# Patient Record
Sex: MALE | Race: WHITE
[De-identification: names, ages, dates, MRNs, and addresses within clinical notes are randomized per-mention and may not be internally consistent; named-entity substitution may affect disease eponyms.]

---

## 2018-05-03 ENCOUNTER — HOSPITAL ENCOUNTER (EMERGENCY)
Dept: HOSPITAL 17 - PHED | Age: 33
Discharge: HOME | End: 2018-05-03
Payer: COMMERCIAL

## 2018-05-03 VITALS
HEART RATE: 77 BPM | DIASTOLIC BLOOD PRESSURE: 82 MMHG | TEMPERATURE: 97.9 F | OXYGEN SATURATION: 97 % | RESPIRATION RATE: 16 BRPM | SYSTOLIC BLOOD PRESSURE: 146 MMHG

## 2018-05-03 VITALS — BODY MASS INDEX: 34.48 KG/M2 | HEIGHT: 73 IN | WEIGHT: 260.15 LBS

## 2018-05-03 VITALS — HEART RATE: 73 BPM | RESPIRATION RATE: 16 BRPM | OXYGEN SATURATION: 98 %

## 2018-05-03 VITALS
SYSTOLIC BLOOD PRESSURE: 127 MMHG | HEART RATE: 82 BPM | RESPIRATION RATE: 16 BRPM | OXYGEN SATURATION: 97 % | DIASTOLIC BLOOD PRESSURE: 72 MMHG

## 2018-05-03 VITALS — DIASTOLIC BLOOD PRESSURE: 79 MMHG | SYSTOLIC BLOOD PRESSURE: 142 MMHG | RESPIRATION RATE: 16 BRPM

## 2018-05-03 VITALS — DIASTOLIC BLOOD PRESSURE: 65 MMHG | SYSTOLIC BLOOD PRESSURE: 130 MMHG

## 2018-05-03 DIAGNOSIS — Z79.899: ICD-10-CM

## 2018-05-03 DIAGNOSIS — I10: ICD-10-CM

## 2018-05-03 DIAGNOSIS — R55: Primary | ICD-10-CM

## 2018-05-03 LAB
ALBUMIN SERPL-MCNC: 3.5 GM/DL (ref 3.4–5)
ALP SERPL-CCNC: 58 U/L (ref 45–117)
ALT SERPL-CCNC: 159 U/L (ref 12–78)
APAP SERPL-MCNC: (no result) MCG/ML (ref 10–30)
AST SERPL-CCNC: 52 U/L (ref 15–37)
BASOPHILS # BLD AUTO: 0.2 TH/MM3 (ref 0–0.2)
BASOPHILS NFR BLD: 2.1 % (ref 0–2)
BILIRUB SERPL-MCNC: 0.5 MG/DL (ref 0.2–1)
BUN SERPL-MCNC: 16 MG/DL (ref 7–18)
CALCIUM SERPL-MCNC: 8.6 MG/DL (ref 8.5–10.1)
CHLORIDE SERPL-SCNC: 107 MEQ/L (ref 98–107)
COLOR UR: YELLOW
CREAT SERPL-MCNC: 1 MG/DL (ref 0.6–1.3)
EOSINOPHIL # BLD: 0.2 TH/MM3 (ref 0–0.4)
EOSINOPHIL NFR BLD: 2.5 % (ref 0–4)
ERYTHROCYTE [DISTWIDTH] IN BLOOD BY AUTOMATED COUNT: 12.8 % (ref 11.6–17.2)
GFR SERPLBLD BASED ON 1.73 SQ M-ARVRAT: 86 ML/MIN (ref 89–?)
GLUCOSE SERPL-MCNC: 105 MG/DL (ref 74–106)
GLUCOSE UR STRIP-MCNC: (no result) MG/DL
HCO3 BLD-SCNC: 29.5 MEQ/L (ref 21–32)
HCT VFR BLD CALC: 43.8 % (ref 39–51)
HGB BLD-MCNC: 15.4 GM/DL (ref 13–17)
HGB UR QL STRIP: (no result)
INR PPP: 1.1 RATIO
KETONES UR STRIP-MCNC: (no result) MG/DL
LYMPHOCYTES # BLD AUTO: 1.8 TH/MM3 (ref 1–4.8)
LYMPHOCYTES NFR BLD AUTO: 24.2 % (ref 9–44)
MCH RBC QN AUTO: 30 PG (ref 27–34)
MCHC RBC AUTO-ENTMCNC: 35.2 % (ref 32–36)
MCV RBC AUTO: 85.4 FL (ref 80–100)
MONOCYTE #: 0.5 TH/MM3 (ref 0–0.9)
MONOCYTES NFR BLD: 6.1 % (ref 0–8)
NEUTROPHILS # BLD AUTO: 4.9 TH/MM3 (ref 1.8–7.7)
NEUTROPHILS NFR BLD AUTO: 65.1 % (ref 16–70)
NITRITE UR QL STRIP: (no result)
PLATELET # BLD: 253 TH/MM3 (ref 150–450)
PMV BLD AUTO: 7.8 FL (ref 7–11)
PROT SERPL-MCNC: 6.7 GM/DL (ref 6.4–8.2)
PROTHROMBIN TIME: 10.9 SEC (ref 9.8–11.6)
RBC # BLD AUTO: 5.13 MIL/MM3 (ref 4.5–5.9)
RBC #/AREA URNS HPF: (no result) /HPF (ref 0–3)
SODIUM SERPL-SCNC: 141 MEQ/L (ref 136–145)
SP GR UR STRIP: 1.02 (ref 1–1.03)
SQUAMOUS #/AREA URNS HPF: (no result) /HPF (ref 0–5)
TROPONIN I SERPL-MCNC: (no result) NG/ML (ref 0.02–0.05)
URINE LEUKOCYTE ESTERASE: (no result)
WBC # BLD AUTO: 7.6 TH/MM3 (ref 4–11)

## 2018-05-03 PROCEDURE — 85730 THROMBOPLASTIN TIME PARTIAL: CPT

## 2018-05-03 PROCEDURE — 85610 PROTHROMBIN TIME: CPT

## 2018-05-03 PROCEDURE — 71045 X-RAY EXAM CHEST 1 VIEW: CPT

## 2018-05-03 PROCEDURE — 96360 HYDRATION IV INFUSION INIT: CPT

## 2018-05-03 PROCEDURE — 93005 ELECTROCARDIOGRAM TRACING: CPT

## 2018-05-03 PROCEDURE — 70450 CT HEAD/BRAIN W/O DYE: CPT

## 2018-05-03 PROCEDURE — 81001 URINALYSIS AUTO W/SCOPE: CPT

## 2018-05-03 PROCEDURE — 84484 ASSAY OF TROPONIN QUANT: CPT

## 2018-05-03 PROCEDURE — 80053 COMPREHEN METABOLIC PANEL: CPT

## 2018-05-03 PROCEDURE — 80307 DRUG TEST PRSMV CHEM ANLYZR: CPT

## 2018-05-03 PROCEDURE — 99285 EMERGENCY DEPT VISIT HI MDM: CPT

## 2018-05-03 PROCEDURE — 85025 COMPLETE CBC W/AUTO DIFF WBC: CPT

## 2018-05-03 PROCEDURE — 84443 ASSAY THYROID STIM HORMONE: CPT

## 2018-05-03 NOTE — PD
HPI


Chief Complaint:  Syncope


Time Seen by Provider:  09:25


Travel History


International Travel<30 days:  No


Contact w/Intl Traveler<30days:  No


Traveled to known affect area:  No





History of Present Illness


HPI


Patient is a protective, while he was sitting up at a table at the station he 

complained of feeling generalized weakness.  According to another  

that was at the table with him, he noticed the patient eyes rolled back and he 

got pale and sweaty, and was mumbling saying noncoherent things.  After 

approximately a period of 8 minutes or so patient fully resolved at that point 

[facility] had already arrived.  According to the patient he was started on 

lisinopril 5 mg taken 2 tablets once a day and he took a total of 3 different 

doses starting with once 2 days ago, last night and this morning at 730.  Per 

witnesses no seizure tonic-clonic activity.  Per patient he denied having any 

prodrome such as headache, visual changes, lateralizing weakness, palpitations, 

nausea vomiting, chest pain, back pain, back pain, abdominal pain.  Patient 

also denies any alleviating or aggravating factors.





pcp is dr wellington


No nondrug allergies


Past medical history significant for hypertension





[facility]


Social History


Alcohol Use:  Yes (SOCIALLY)


Tobacco Use:  No


Substance Use:  No





Allergies-Medications


(Allergen,Severity, Reaction):  


Coded Allergies:  


     No Known Allergies (Verified  Adverse Reaction, Unknown, 5/3/18)


Reported Meds & Prescriptions





Reported Meds & Active Scripts


Active


Reported


Lisinopril 5 Mg Tab 5 Mg PO BID








Review of Systems


General / Constitutional:  No: Fever


Eyes:  No: Visual changes


HENT:  No: Headaches


Cardiovascular:  No: Chest Pain or Discomfort


Respiratory:  No: Shortness of Breath


Gastrointestinal:  No: Abdominal Pain


Genitourinary:  No: Dysuria


Musculoskeletal:  No: Pain


Skin:  No Rash


Neurologic:  Positive: Syncope


Psychiatric:  No: Depression


Endocrine:  No: Polydipsia


Hematologic/Lymphatic:  No: Easy Bruising





Physical Exam


Narrative


GENERAL: 


SKIN: Warm and dry.


HEAD: Atraumatic. Normocephalic. 


EYES: Pupils equal and round. No scleral icterus. No injection or drainage. 


ENT: No nasal bleeding or discharge.  Mucous membranes pink and moist.


NECK: Trachea midline. No JVD. 


CARDIOVASCULAR: Regular rate and rhythm.  


RESPIRATORY: No accessory muscle use. Clear to auscultation. Breath sounds 

equal bilaterally. 


GASTROINTESTINAL: Abdomen soft, non-tender, nondistended. 


MUSCULOSKELETAL: Extremities without clubbing, cyanosis, or edema. No obvious 

deformities. 


NEUROLOGICAL: Awake and alert. No obvious cranial nerve deficits.  Motor 

grossly within normal limits. Five out of 5 muscle strength in the arms and 

legs.  Normal speech.


PSYCHIATRIC: Appropriate mood and affect; insight and judgment normal.





Data


Data


Last Documented VS





Vital Signs








  Date Time  Temp Pulse Resp B/P (MAP) Pulse Ox O2 Delivery O2 Flow Rate FiO2


 


5/3/18 10:18  74 20 142/69 (93)    





  80 16 141/73 (95)    





  82 16 138/79 (98)    


 


5/3/18 09:51     98 Room Air  


 


5/3/18 09:27 97.9       








Orders





 Orders


Electrocardiogram (5/3/18 09:44)


Complete Blood Count With Diff (5/3/18 09:44)


Comprehensive Metabolic Panel (5/3/18 09:44)


Prothrombin Time / Inr (Pt) (5/3/18 09:44)


Act Partial Throm Time (Ptt) (5/3/18 09:44)


Troponin I (5/3/18 09:44)


Thyroid Stimulating Hormone (5/3/18 09:44)


Urinalysis - C+S If Indicated (5/3/18 09:44)


Chest, Single Ap (5/3/18 09:44)


Ct Brain W/O Iv Contrast(Rout) (5/3/18 09:44)


Blood Glucose (5/3/18 09:44)


Ecg Monitoring (5/3/18 09:44)


Iv Access Insert/Monitor (5/3/18 09:44)


Oximetry (5/3/18 09:44)


Sodium Chloride 0.9% Flush (Ns Flush) (5/3/18 09:45)


Drug Screen, Random Urine (5/3/18 09:44)


Alcohol (Ethanol) (5/3/18 09:44)


Tylenol (Acetaminophen) (5/3/18 09:44)


Salicylates (Aspirin) (5/3/18 09:44)


Orthostatic Vital Signs (5/3/18 09:45)


Sodium Chlor 0.9% 1000 Ml Inj (Ns 1000 M (5/3/18 11:30)





Labs





Laboratory Tests








Test


  5/3/18


10:05 5/3/18


11:00


 


White Blood Count 7.6 TH/MM3  


 


Red Blood Count 5.13 MIL/MM3  


 


Hemoglobin 15.4 GM/DL  


 


Hematocrit 43.8 %  


 


Mean Corpuscular Volume 85.4 FL  


 


Mean Corpuscular Hemoglobin 30.0 PG  


 


Mean Corpuscular Hemoglobin


Concent 35.2 % 


  


 


 


Red Cell Distribution Width 12.8 %  


 


Platelet Count 253 TH/MM3  


 


Mean Platelet Volume 7.8 FL  


 


Neutrophils (%) (Auto) 65.1 %  


 


Lymphocytes (%) (Auto) 24.2 %  


 


Monocytes (%) (Auto) 6.1 %  


 


Eosinophils (%) (Auto) 2.5 %  


 


Basophils (%) (Auto) 2.1 %  


 


Neutrophils # (Auto) 4.9 TH/MM3  


 


Lymphocytes # (Auto) 1.8 TH/MM3  


 


Monocytes # (Auto) 0.5 TH/MM3  


 


Eosinophils # (Auto) 0.2 TH/MM3  


 


Basophils # (Auto) 0.2 TH/MM3  


 


CBC Comment DIFF FINAL  


 


Differential Comment   


 


Prothrombin Time 10.9 SEC  


 


Prothromb Time International


Ratio 1.1 RATIO 


  


 


 


Activated Partial


Thromboplast Time 19.7 SEC 


  


 


 


Blood Urea Nitrogen 16 MG/DL  


 


Creatinine 1.00 MG/DL  


 


Random Glucose 105 MG/DL  


 


Total Protein 6.7 GM/DL  


 


Albumin 3.5 GM/DL  


 


Calcium Level 8.6 MG/DL  


 


Alkaline Phosphatase 58 U/L  


 


Aspartate Amino Transf


(AST/SGOT) 52 U/L 


  


 


 


Alanine Aminotransferase


(ALT/SGPT) 159 U/L 


  


 


 


Total Bilirubin 0.5 MG/DL  


 


Sodium Level 141 MEQ/L  


 


Potassium Level 3.8 MEQ/L  


 


Chloride Level 107 MEQ/L  


 


Carbon Dioxide Level 29.5 MEQ/L  


 


Anion Gap 5 MEQ/L  


 


Estimat Glomerular Filtration


Rate 86 ML/MIN 


  


 


 


Troponin I


  LESS THAN 0.02


NG/ML 


 


 


Thyroid Stimulating Hormone


3rd Gen 3.190 uIU/ML 


  


 


 


Ethyl Alcohol Level


  LESS THAN 3


MG/DL 


 


 


Urine Collection Type  CATH 


 


Urine Color  YELLOW 


 


Urine Turbidity  CLEAR 


 


Urine pH  7.0 


 


Urine Specific Gravity  1.020 


 


Urine Protein  NEG mg/dL 


 


Urine Glucose (UA)  NEG mg/dL 


 


Urine Ketones  NEG mg/dL 


 


Urine Occult Blood  NEG 


 


Urine Nitrite  NEG 


 


Urine Bilirubin  NEG 


 


Urine Urobilinogen  0.2 MG/DL 


 


Urine Leukocyte Esterase  NEG 


 


Urine RBC  0-3 /hpf 


 


Urine Squamous Epithelial


Cells 


  0-5 /hpf 


 


 


Microscopic Urinalysis Comment


  


  CULT NOT


INDICATED


 


Urine Collection Time  11:00 


 


Urine Barbiturates Screen  NEG 


 


Urine Amphetamines Screen  NEG 


 


Urine Benzodiazepines Screen  NEG 


 


Urine Cocaine Screen  NEG 


 


Urine Cannabinoids Screen  NEG 











[facility]


Medical Decision Making


Medical Screen Exam Complete:  Yes


Emergency Medical Condition:  Yes


Medical Record Reviewed:  Yes


Interpretation(s)


Pulse ox showed an excellent pleth wave, on room air, reading  which is 

within normal limits and shows no evidence of any hypoxemia





EKG shows a normal sinus rhythm, 76 bpm, normal intervals, no evidence of any 

ST elevation MI at this present time however there was nonspecific T-wave 

inversion on lead #3 only.


Differential Diagnosis


Orthostatic hypotension versus CVA versus aneurysm versus STEMI versus non-

STEMI versus dehydration


Narrative Course


Negative orthostatic vitals


CBC no evidence of leukocytosis, no left shift, no anemia, and normal platelet 

count.


Coagulation profile is within normal limits


Electrolytes are all within normal limits, normal kidney functions, negative 

first set of cardiac enzymes, normal TSH screening test, slightly elevated 

though nondiagnostic AST of 52 and ALT of 159 with normal alk phos and normal 

bilirubin


Negative alcohol, tox screen negative for barbiturates and amphetamines 

benzodiazepines cocaine or marijuana.


UA shows no evidence of a UTI





Diagnosis





 Primary Impression:  


 Syncope possibly secondary to lisinopril use


Patient Instructions:  Adverse Drug Reaction (ED), General Instructions, 

Syncope (ED)





***Additional Instructions:  


Your highly recommended to follow-up with your primary care, , to get 

further screening tests performed.  The screening test should include, 

echocardiogram of your heart, Holter monitor, possibly an MRI of your brain and 

MRA of your carotids as well as follow-up with a neurologist to get fully 

cleared.  Although it is possible that your symptoms occurred secondary to the 

lisinopril, your blood pressure remain somewhat elevated and normal, and you 

did not have any evidence of orthostatic hypotension which would have been 

expected if this was caused by lisinopril.


Disposition:  01 DISCHARGE HOME


Condition:  Stable











Ward Hernandez MD May 3, 2018 09:27

## 2018-05-03 NOTE — RADRPT
EXAM DATE/TIME:  05/03/2018 10:20 

 

[facility] COMPARISON:     

No previous studies available for comparison.

 

                     

INDICATIONS :     

Syncope, patient just started a new medication for blood pressure.

                     

 

MEDICAL HISTORY :     

Hypertension.          

 

SURGICAL HISTORY :     

None.   

 

ENCOUNTER:     

Initial                                        

 

ACUITY:     

1 day      

 

PAIN SCORE:     

0/10

 

LOCATION:     

Bilateral chest 

 

FINDINGS:     

Portable AP view of the chest demonstrates a normal-sized cardiac silhouette. No effusion, consolidat
ion, or pneumothorax is visualized. The bones and soft tissues demonstrate no acute abnormality. Lung
s are underinflated and EKG lines overlie the patient.

 

CONCLUSION:     

No acute cardiopulmonary abnormality is identified.

 

 

 

 Estiven Anderson MD on May 03, 2018 at 10:31           

Board Certified Radiologist.

 This report was verified electronically.

## 2018-05-03 NOTE — EKG
Date Performed: 2018       Time Performed: 09:56:08

 

PTAGE:      33 years

 

EKG:      Sinus rhythm 

 

 NORMAL ECG Since the 

 

 PREVIOUS TRACING            , no significant change noted PREVIOUS TRACIN2010 23.49

 

DOCTOR:   Camelia Ashley  Interpretating Date/Time  2018 14:14:14

## 2018-05-03 NOTE — RADRPT
EXAM DATE/TIME:  05/03/2018 10:27 

 

[facility] COMPARISON:     

No previous studies available for comparison.

 

 

INDICATIONS :     

***Syncopal episode. 

                      

 

RADIATION DOSE:     

62.43 CTDIvol (mGy) 

 

 

 

MEDICAL HISTORY :     

Hypertension.  

 

SURGICAL HISTORY :      

None. 

 

ENCOUNTER:      

Initial

 

ACUITY:      

1 day

 

PAIN SCALE:      

0/10

 

LOCATION:        

cranial 

 

TECHNIQUE:     

Multiple contiguous axial images were obtained of the head.  Using automated exposure control and adj
ustment of the mA and/or kV according to patient size, radiation dose was kept as low as reasonably a
chievable to obtain optimal diagnostic quality images.   DICOM format image data is available electro
nically for review and comparison.  

 

FINDINGS:     

 

CEREBRUM:     

The ventricles are normal.  No evidence of midline shift, mass lesion, hemorrhage or acute infarction
.  No extra-axial fluid collections are seen.

 

POSTERIOR FOSSA:     

The cerebellum and brainstem are intact.  The 4th ventricle is midline.  The cerebellopontine angle i
s unremarkable.

 

EXTRACRANIAL:     

Visualized sinuses are clear.

 

SKULL:     

The calvaria is intact.  No evidence of skull fracture.

 

CONCLUSION:     

Negative noncontrast head CT.

 

 

 

 Estiven Anderson MD on May 03, 2018 at 10:47           

Board Certified Radiologist.

 This report was verified electronically.